# Patient Record
Sex: MALE | Employment: FULL TIME | URBAN - METROPOLITAN AREA
[De-identification: names, ages, dates, MRNs, and addresses within clinical notes are randomized per-mention and may not be internally consistent; named-entity substitution may affect disease eponyms.]

---

## 2017-04-10 PROBLEM — K21.9 GASTROESOPHAGEAL REFLUX DISEASE WITHOUT ESOPHAGITIS: Status: ACTIVE | Noted: 2017-04-10

## 2018-08-05 ENCOUNTER — HOSPITAL ENCOUNTER (EMERGENCY)
Age: 43
Discharge: HOME | End: 2018-08-05
Attending: EMERGENCY MEDICINE
Payer: COMMERCIAL

## 2018-08-05 VITALS
OXYGEN SATURATION: 100 % | TEMPERATURE: 98 F | HEIGHT: 74 IN | SYSTOLIC BLOOD PRESSURE: 106 MMHG | BODY MASS INDEX: 23.74 KG/M2 | RESPIRATION RATE: 14 BRPM | WEIGHT: 185 LBS | DIASTOLIC BLOOD PRESSURE: 64 MMHG | HEART RATE: 53 BPM

## 2018-08-05 DIAGNOSIS — R20.2 PARESTHESIAS: Primary | ICD-10-CM

## 2018-08-05 DIAGNOSIS — E86.0 DEHYDRATION: ICD-10-CM

## 2018-08-05 LAB
ALBUMIN SERPL-MCNC: 4 G/DL (ref 3.5–4.8)
ALBUMIN/GLOB SERPL: 1.2 {RATIO} (ref 1–2)
ALP LIVER SERPL-CCNC: 53 U/L (ref 45–117)
ALT SERPL-CCNC: 22 U/L (ref 17–63)
ANION GAP SERPL CALC-SCNC: 6 MMOL/L (ref 0–18)
AST SERPL-CCNC: 18 U/L (ref 15–41)
ATRIAL RATE: 47 BPM
BASOPHILS # BLD AUTO: 0.02 X10(3) UL (ref 0–0.1)
BASOPHILS NFR BLD AUTO: 0.5 %
BILIRUB SERPL-MCNC: 0.8 MG/DL (ref 0.1–2)
BUN BLD-MCNC: 19 MG/DL (ref 8–20)
BUN/CREAT SERPL: 17.1 (ref 10–20)
CALCIUM BLD-MCNC: 8.6 MG/DL (ref 8.3–10.3)
CHLORIDE SERPL-SCNC: 105 MMOL/L (ref 101–111)
CO2 SERPL-SCNC: 29 MMOL/L (ref 22–32)
CREAT BLD-MCNC: 1.11 MG/DL (ref 0.7–1.3)
EOSINOPHIL # BLD AUTO: 0.02 X10(3) UL (ref 0–0.3)
EOSINOPHIL NFR BLD AUTO: 0.5 %
ERYTHROCYTE [DISTWIDTH] IN BLOOD BY AUTOMATED COUNT: 11.9 % (ref 11.5–16)
GLOBULIN PLAS-MCNC: 3.4 G/DL (ref 2.5–3.7)
GLUCOSE BLD-MCNC: 96 MG/DL (ref 70–99)
HCT VFR BLD AUTO: 43.5 % (ref 37–53)
HGB BLD-MCNC: 14.7 G/DL (ref 13–17)
IMMATURE GRANULOCYTE COUNT: 0 X10(3) UL (ref 0–1)
IMMATURE GRANULOCYTE RATIO %: 0 %
LYMPHOCYTES # BLD AUTO: 1.8 X10(3) UL (ref 0.9–4)
LYMPHOCYTES NFR BLD AUTO: 48.3 %
M PROTEIN MFR SERPL ELPH: 7.4 G/DL (ref 6.1–8.3)
MCH RBC QN AUTO: 31 PG (ref 27–33.2)
MCHC RBC AUTO-ENTMCNC: 33.8 G/DL (ref 31–37)
MCV RBC AUTO: 91.8 FL (ref 80–99)
MONOCYTES # BLD AUTO: 0.36 X10(3) UL (ref 0.1–1)
MONOCYTES NFR BLD AUTO: 9.7 %
NEUTROPHIL ABS PRELIM: 1.53 X10 (3) UL (ref 1.3–6.7)
NEUTROPHILS # BLD AUTO: 1.53 X10(3) UL (ref 1.3–6.7)
NEUTROPHILS NFR BLD AUTO: 41 %
OSMOLALITY SERPL CALC.SUM OF ELEC: 292 MOSM/KG (ref 275–295)
P AXIS: 48 DEGREES
P-R INTERVAL: 170 MS
PLATELET # BLD AUTO: 213 10(3)UL (ref 150–450)
POTASSIUM SERPL-SCNC: 3.8 MMOL/L (ref 3.6–5.1)
Q-T INTERVAL: 434 MS
QRS DURATION: 90 MS
QTC CALCULATION (BEZET): 384 MS
R AXIS: 69 DEGREES
RBC # BLD AUTO: 4.74 X10(6)UL (ref 4.3–5.7)
RED CELL DISTRIBUTION WIDTH-SD: 40.2 FL (ref 35.1–46.3)
SODIUM SERPL-SCNC: 140 MMOL/L (ref 136–144)
T AXIS: 40 DEGREES
VENTRICULAR RATE: 47 BPM
WBC # BLD AUTO: 3.7 X10(3) UL (ref 4–13)

## 2018-08-05 PROCEDURE — 96360 HYDRATION IV INFUSION INIT: CPT

## 2018-08-05 PROCEDURE — 99285 EMERGENCY DEPT VISIT HI MDM: CPT

## 2018-08-05 PROCEDURE — 85025 COMPLETE CBC W/AUTO DIFF WBC: CPT | Performed by: EMERGENCY MEDICINE

## 2018-08-05 PROCEDURE — 80053 COMPREHEN METABOLIC PANEL: CPT | Performed by: EMERGENCY MEDICINE

## 2018-08-05 PROCEDURE — 93005 ELECTROCARDIOGRAM TRACING: CPT

## 2018-08-05 PROCEDURE — 93010 ELECTROCARDIOGRAM REPORT: CPT

## 2018-08-05 NOTE — ED INITIAL ASSESSMENT (HPI)
PT STATES HE FELT LEFT ARM NUMBNESS FROM ELBOW DOWN TO HAND AND LEFT LOWER LEG YESTERDAY FOR APPROX A COUPLE HOURS WENT AWAY AND CAME BACK THIS MORNING ABOUT 10AM. PT STATES HE FEELS A LITTLE NAUSEATED. PT WIFE STATES SHE FEELS LIKE HES IN A DAZE.  DENIES P

## 2018-08-05 NOTE — ED PROVIDER NOTES
Patient Seen in: [facility] Emergency Department In [place]    History   Patient presents with:  Numbness Weakness (neurologic)    Stated Complaint: numbness to left arm and leg    HPI    Patient presents to the emergency department with numbness to his l rate and rhythm without murmurs rubs or gallops he does have a bradycardia which is probably baseline for this gentleman with his history of running. Equal bilateral  strength, no obvious signs of focal neuro deficit.     ED Course     Labs Reviewed pm    Follow-up:  Bhupendra Ceron MD  [address] [place] [phone]      As needed        Medications Prescribed:  Discharge Medication List as of 8/5/2018  2:03 PM

## 2019-02-05 PROBLEM — M25.851 FEMOROACETABULAR IMPINGEMENT OF BOTH HIPS: Status: ACTIVE | Noted: 2019-02-05

## 2019-02-05 PROBLEM — M25.852 FEMOROACETABULAR IMPINGEMENT OF BOTH HIPS: Status: ACTIVE | Noted: 2019-02-05

## 2019-02-05 PROBLEM — M76.892 TENDINITIS OF LEFT HIP: Status: ACTIVE | Noted: 2019-02-05

## 2019-02-05 PROBLEM — M76.891 TENDINITIS OF RIGHT HIP: Status: ACTIVE | Noted: 2019-02-05

## 2020-05-14 PROBLEM — K21.9 GASTROESOPHAGEAL REFLUX DISEASE WITHOUT ESOPHAGITIS: Status: RESOLVED | Noted: 2017-04-10 | Resolved: 2020-05-14

## 2022-04-12 ENCOUNTER — LAB REQUISITION (OUTPATIENT)
Dept: LAB | Facility: HOSPITAL | Age: 47
End: 2022-04-12
Payer: COMMERCIAL

## 2022-04-12 PROCEDURE — 88304 TISSUE EXAM BY PATHOLOGIST: CPT | Performed by: UROLOGY

## (undated) DIAGNOSIS — N43.40 SPERMATOCELE OF EPIDIDYMIS, UNSPECIFIED: ICD-10-CM

## (undated) NOTE — ED AVS SNAPSHOT
Praveen Gonzales   MRN: [id number]    Department:  [facility] Emergency Department in [place]   Date of Visit:  8/5/2018           Disclosure     Insurance plans vary and the physician(s) referred by the ER may not be covered by your plan.  Please contact y tell this physician (or your personal doctor if your instructions are to return to your personal doctor) about any new or lasting problems. The primary care or specialist physician will see patients referred from the [facility] Emergency Department.  Marva Elizabeth